# Patient Record
Sex: FEMALE | Race: OTHER | Employment: FULL TIME | URBAN - METROPOLITAN AREA
[De-identification: names, ages, dates, MRNs, and addresses within clinical notes are randomized per-mention and may not be internally consistent; named-entity substitution may affect disease eponyms.]

---
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
## 2025-01-14 ENCOUNTER — OFFICE VISIT (OUTPATIENT)
Age: 20
End: 2025-01-14
Payer: COMMERCIAL

## 2025-01-14 VITALS
SYSTOLIC BLOOD PRESSURE: 114 MMHG | BODY MASS INDEX: 21.21 KG/M2 | OXYGEN SATURATION: 98 % | DIASTOLIC BLOOD PRESSURE: 76 MMHG | WEIGHT: 131.4 LBS | HEART RATE: 63 BPM

## 2025-01-14 DIAGNOSIS — R00.2 PALPITATIONS: ICD-10-CM

## 2025-01-14 DIAGNOSIS — Z15.89 MTHFR MUTATION: Primary | ICD-10-CM

## 2025-01-14 PROCEDURE — 93000 ELECTROCARDIOGRAM COMPLETE: CPT | Performed by: INTERNAL MEDICINE

## 2025-01-14 PROCEDURE — 99204 OFFICE O/P NEW MOD 45 MIN: CPT | Performed by: INTERNAL MEDICINE

## 2025-01-14 NOTE — PROGRESS NOTES
\"LABGLOM\", \"GLUCOSE\" in the last 72 hours.  PT/INR: No results for input(s): \"PROTIME\", \"INR\" in the last 72 hours.  FASTING LIPID PANEL:No results found for: \"HDL\", \"LDLDIRECT\", \"TRIG\"  LIVER PROFILE:No results for input(s): \"AST\", \"ALT\", \"LABALBU\" in the last 72 hours.    EKG 1/14/2025 : sinus bradycardia, otherwise normal ecg       Past Medical and Surgical History, Problem List, Allergies, Medications, Labs, Imaging, all reviewed extensively in EMR and with the patient.    Assessment:   Episode of sudden onset tachycardia November 2024 with heart rate greater than 200, ECG on arrival to ED showed sinus tachycardia, symptoms resolved after starting beta-blocker, overall presentation suggestive of paroxysmal supraventricular tachycardia  Hypercoagulable/MTHFR mutation  Family history of dilated cardiomyopathy      Plan:     Continue Toprol-XL 12.5 mg daily  Patient counseled to avoid any caffeine use  Will obtain echocardiogram to rule out any underlying structural heart disease  Check TSH  Follow-up with hematology as scheduled        The patient was counseled regarding heart healthy diet, weight loss and exercise as tolerated. Patient's medications and side effects were discussed. All questions and concerns were addressed to patient's satisfaction.     The patient is to follow up in 6 months or sooner if necessary.     Thank you for allowing me to participate in the care of this patient, please do not hesitate to call if you have any questions.      Tatum Mendez MD, FACC, [facility]

## 2025-01-17 ENCOUNTER — TELEPHONE (OUTPATIENT)
Dept: ONCOLOGY | Age: 20
End: 2025-01-17

## 2025-01-21 ENCOUNTER — TELEPHONE (OUTPATIENT)
Dept: ONCOLOGY | Age: 20
End: 2025-01-21

## 2025-01-21 ENCOUNTER — INITIAL CONSULT (OUTPATIENT)
Dept: ONCOLOGY | Age: 20
End: 2025-01-21
Payer: COMMERCIAL

## 2025-01-21 VITALS
BODY MASS INDEX: 21.38 KG/M2 | WEIGHT: 133 LBS | HEIGHT: 66 IN | HEART RATE: 69 BPM | SYSTOLIC BLOOD PRESSURE: 104 MMHG | TEMPERATURE: 98.2 F | DIASTOLIC BLOOD PRESSURE: 69 MMHG

## 2025-01-21 DIAGNOSIS — D68.59 THROMBOPHILIA (HCC): Primary | ICD-10-CM

## 2025-01-21 DIAGNOSIS — N92.0 MENORRHAGIA WITH REGULAR CYCLE: ICD-10-CM

## 2025-01-21 PROCEDURE — 99205 OFFICE O/P NEW HI 60 MIN: CPT | Performed by: INTERNAL MEDICINE

## 2025-01-21 NOTE — PROGRESS NOTES
document can still have some errors including those of syntax and sound a like substitutions which may escape proof reading.  It such instances, actual meaning can be extrapolated by contextual diversion.

## 2025-01-21 NOTE — TELEPHONE ENCOUNTER
AVS 01/21/25    Labs ordered today  Beta-2 GlycoProtein 1 Ab,IGG & IGM  Complete this on or around 1/21/2025.  Cardiolipin Ab IgG, IgM, IgA  Complete this on or around 1/21/2025.  Factor 5 Leiden  Complete this on or around 1/21/2025.  Factor VIII Activity  Complete this on or around 1/21/2025.  Fibrinogen  Complete this on or around 1/21/2025.  Protein C Activity  Complete this on or around 1/21/2025.  Protein S Antigen, Total  Complete this on or around 1/21/2025.  Protein S activity  Complete this on or around 1/21/2025.  Prothrombin Gene Mutation  Complete this on or around 1/21/2025.    RV 02/25/25    Labs to be done week prior    PT was given AVS and appt schedule    Electronically signed by Ly Anderson on 1/21/2025 at 11:03 AM

## 2025-02-18 ENCOUNTER — TELEPHONE (OUTPATIENT)
Dept: ONCOLOGY | Age: 20
End: 2025-02-18

## 2025-02-24 ENCOUNTER — TELEPHONE (OUTPATIENT)
Dept: ONCOLOGY | Age: 20
End: 2025-02-24

## 2025-02-24 NOTE — TELEPHONE ENCOUNTER
Pt called office to cancel appt for tomorrow 2/25/25. Pt stated she received office vm about getting labs completed. She wanted time to get those done and wanted to call office back when she is ready to reschedule.     Electronically signed by yL Anderson on 2/24/2025 at 9:56 AM

## 2025-05-16 ENCOUNTER — TELEPHONE (OUTPATIENT)
Age: 20
End: 2025-05-16

## 2025-05-16 ENCOUNTER — OFFICE VISIT (OUTPATIENT)
Age: 20
End: 2025-05-16

## 2025-05-16 VITALS
SYSTOLIC BLOOD PRESSURE: 115 MMHG | WEIGHT: 134.4 LBS | HEART RATE: 98 BPM | TEMPERATURE: 99 F | DIASTOLIC BLOOD PRESSURE: 74 MMHG | BODY MASS INDEX: 21.69 KG/M2 | RESPIRATION RATE: 17 BRPM

## 2025-05-16 DIAGNOSIS — Z15.89 MTHFR GENE MUTATION: ICD-10-CM

## 2025-05-16 DIAGNOSIS — N92.0 MENORRHAGIA WITH REGULAR CYCLE: ICD-10-CM

## 2025-05-16 DIAGNOSIS — D68.59 THROMBOPHILIA: Primary | ICD-10-CM

## 2025-05-16 RX ORDER — AMOXICILLIN 500 MG/1
500 CAPSULE ORAL 2 TIMES DAILY
COMMUNITY

## 2025-05-16 NOTE — TELEPHONE ENCOUNTER
AVS 05/16/25    Instructions   from Dr. Salomón Blackwood MD    Rtc in 6 months with lab  Please update pharmacy information          Labs ordered today  Ferritin  Complete this on or around 5/16/2025.  Homocysteine  Complete this on or around 5/16/2025.  Iron and TIBC  Complete this on or around 5/16/2025.  Vitamin B12 & Folate  Complete this on or around 5/16/2025.       RV 11/04/25    Labs to be done week prior    MA updated pharmacy info    PT was given AVS and appt schedule    Electronically signed by Ly Anderson on 5/16/2025 at 1:54 PM

## 2025-05-16 NOTE — PROGRESS NOTES
History    Marital status: Single     Spouse name: Not on file    Number of children: Not on file    Years of education: Not on file    Highest education level: Not on file   Occupational History    Not on file   Tobacco Use    Smoking status: Never    Smokeless tobacco: Never   Vaping Use    Vaping status: Never Used   Substance and Sexual Activity    Alcohol use: Yes     Alcohol/week: 2.0 standard drinks of alcohol     Types: 2 Standard drinks or equivalent per week     Comment: social    Drug use: Never    Sexual activity: Not Currently     Partners: Male   Other Topics Concern    Not on file   Social History Narrative    Not on file     Social Drivers of Health     Financial Resource Strain: Not on file   Food Insecurity: No Food Insecurity (7/9/2024)    Received from [facility]    Hunger Screening     Within the past 12 months we worried whether our food would run out before we got money to buy more.: Never True     Within the past 12 months the food we bought just didn't last and we didn't have money to get more.: Never True   Transportation Needs: Not on file   Physical Activity: Not on file   Stress: Not on file   Social Connections: Not on file   Intimate Partner Violence: Not on file   Housing Stability: Not on file       Family History   Problem Relation Age of Onset    Heart Disease Father     Ovarian Cancer Maternal Great Grandmother     Uterine Cancer Maternal Great Grandmother     Cervical Cancer Maternal Great Grandmother         REVIEW OF SYSTEM:     Constitutional: No fever or chills. No night sweats, no weight loss   Eyes: No eye discharge, double vision, or eye pain   HEENT: negative for sore mouth, sore throat, hoarseness and voice change   Respiratory: negative for cough , sputum, dyspnea, wheezing, hemoptysis, chest pain   Cardiovascular: negative for chest pain, dyspnea, palpitations, orthopnea, PND   Gastrointestinal: negative for nausea, vomiting, diarrhea, constipation,